# Patient Record
Sex: MALE | Race: WHITE | Employment: FULL TIME | URBAN - NONMETROPOLITAN AREA
[De-identification: names, ages, dates, MRNs, and addresses within clinical notes are randomized per-mention and may not be internally consistent; named-entity substitution may affect disease eponyms.]

---

## 2021-12-16 ENCOUNTER — TELEPHONE (OUTPATIENT)
Dept: PAIN MANAGEMENT | Age: 31
End: 2021-12-16

## 2021-12-16 NOTE — PROGRESS NOTES
OARRS checked:    11/12/21 Hydrocodone 5/325 mg #10 per prescriber Anaya Banegas with one valium    1/15/21 Flexeril 10 mg #30 per [facility].

## 2021-12-16 NOTE — TELEPHONE ENCOUNTER
LM for pt to call to schedule New visit with Pain Mgmt per referral from Imelda Ervin. See scan. Pt's address is [place].

## 2021-12-21 VITALS
SYSTOLIC BLOOD PRESSURE: 135 MMHG | DIASTOLIC BLOOD PRESSURE: 94 MMHG | HEIGHT: 71 IN | WEIGHT: 194 LBS | BODY MASS INDEX: 27.16 KG/M2

## 2021-12-21 RX ORDER — FAMOTIDINE 20 MG/1
20 TABLET, FILM COATED ORAL 2 TIMES DAILY
COMMUNITY

## 2021-12-21 RX ORDER — IBUPROFEN 200 MG
200 TABLET ORAL EVERY 6 HOURS PRN
COMMUNITY

## 2021-12-21 RX ORDER — DICLOFENAC SODIUM 75 MG/1
1 TABLET, DELAYED RELEASE ORAL 2 TIMES DAILY
COMMUNITY
Start: 2021-10-12

## 2021-12-21 RX ORDER — ACETAMINOPHEN, ASPIRIN AND CAFFEINE 250; 250; 65 MG/1; MG/1; MG/1
1 TABLET, FILM COATED ORAL EVERY 6 HOURS PRN
COMMUNITY

## 2021-12-21 NOTE — TELEPHONE ENCOUNTER
Pt returned call. I scheduled him with Tammie Medley on Tues 1/10 at 1:30 PM.    This is his first time to any pain mgmt. Oarrs checked again, Dr. Sophronia Epley and [facility], small refills.

## 2022-01-11 ENCOUNTER — OFFICE VISIT (OUTPATIENT)
Dept: PAIN MANAGEMENT | Age: 32
End: 2022-01-11
Payer: COMMERCIAL

## 2022-01-11 VITALS
WEIGHT: 195.4 LBS | HEIGHT: 71 IN | OXYGEN SATURATION: 97 % | DIASTOLIC BLOOD PRESSURE: 82 MMHG | BODY MASS INDEX: 27.35 KG/M2 | HEART RATE: 106 BPM | SYSTOLIC BLOOD PRESSURE: 122 MMHG

## 2022-01-11 DIAGNOSIS — G89.29 CHRONIC BILATERAL LOW BACK PAIN WITHOUT SCIATICA: ICD-10-CM

## 2022-01-11 DIAGNOSIS — M54.06 PANNICULITIS INVOLVING LUMBAR REGION: ICD-10-CM

## 2022-01-11 DIAGNOSIS — M54.50 CHRONIC BILATERAL LOW BACK PAIN WITHOUT SCIATICA: ICD-10-CM

## 2022-01-11 DIAGNOSIS — M47.816 LUMBAR FACET JOINT SYNDROME: Primary | ICD-10-CM

## 2022-01-11 PROCEDURE — 99204 OFFICE O/P NEW MOD 45 MIN: CPT | Performed by: NURSE PRACTITIONER

## 2022-01-11 ASSESSMENT — ENCOUNTER SYMPTOMS
RESPIRATORY NEGATIVE: 1
BACK PAIN: 1
EYES NEGATIVE: 1
ALLERGIC/IMMUNOLOGIC NEGATIVE: 1
BLOOD IN STOOL: 1

## 2022-01-11 NOTE — PATIENT INSTRUCTIONS
[facility]  [address]  Telephone [phone]  Fax [phone]      PROCEDURE INSTRUCTIONS FOR  PAIN MANAGEMENT PROCEDURES WITHOUT IV SEDATION    Isidra Tabor scheduled to see Dr. Mikael Cantu to undergo the following procedure:  Bilateral L5-S1 Diagnostic Medial Branch Block    Procedure Date: ____1/21/22____  You will receive a phone call the day prior to your procedure to confirm a time of arrival.    Report to the [place] [id number], Registration office on the 1st floor in the hospital, after check in and signing of paper work you will then go to the second floor to the surgery center. 1. Stop the following medications prior to the procedure:    NONE    2. Take all routine medications unless otherwise instructed. Ok to take vitamins and antiinflammatory medications    3. EATING & DRINKING:  Ok to eat or drink before the injection - no IV sedation will be used. 4. If you are allergic to contrast or iodine, you must take benadryl and prednisone prior to the injection to prevent an allergic reaction. Follow the directions on the prescription for the times to take the medication. 5. Oral valium can be prescribed if your are anxious about the injections or feel that you can not lay still during the injection. If you take valium, you must have a . Make arrangements for a family member or friend to drive you to the surgery center. Your ride must stay in the hospital while you are having the injection done. If they cannot stay, the injection will be rescheduled. The valium may affect your judgment following the procedure and driving a vehicle within 24 hours after the sedation could be dangerous. 6.  Wear simple loose clothing, which can be easily changed. 7.  Leave jewelry (including rings) and other valuables at home.      8.  You will be asked to sign several forms prior to surgery; patients under the age of 25 must have a parent or legal guardian sign the permit to be able to do the procedure. 9.  You must have finished any antibiotic prescribed for recent infections. If required, please take pre-procedure antibiotic or other pre-procedure medications as instructed. 10. Bring inhalers and pain medications with you to your procedure. 11. Bring your MRI/CT films if they were done outside of the [facility]. 12. If you should develop a cold, sore throat, cough, fever or other new indication of illness or infection, or are started on antibiotics within 2 weeks of the scheduled procedure, please notify the [facility] office as early as possible at [phone]. If calling after 4:30pm the day prior to your scheduled procedure please contact [phone] and Leave a Voice Message.

## 2022-01-21 ENCOUNTER — APPOINTMENT (OUTPATIENT)
Dept: GENERAL RADIOLOGY | Age: 32
End: 2022-01-21
Attending: PHYSICAL MEDICINE & REHABILITATION
Payer: COMMERCIAL

## 2022-01-21 ENCOUNTER — HOSPITAL ENCOUNTER (OUTPATIENT)
Age: 32
Setting detail: OUTPATIENT SURGERY
Discharge: HOME | End: 2022-01-21
Attending: PHYSICAL MEDICINE & REHABILITATION | Admitting: PHYSICAL MEDICINE & REHABILITATION
Payer: COMMERCIAL

## 2022-01-21 VITALS
RESPIRATION RATE: 16 BRPM | BODY MASS INDEX: 27.02 KG/M2 | SYSTOLIC BLOOD PRESSURE: 122 MMHG | OXYGEN SATURATION: 98 % | HEART RATE: 66 BPM | HEIGHT: 71 IN | WEIGHT: 193 LBS | DIASTOLIC BLOOD PRESSURE: 76 MMHG | TEMPERATURE: 97.3 F

## 2022-01-21 PROCEDURE — 6360000004 HC RX CONTRAST MEDICATION: Performed by: PHYSICAL MEDICINE & REHABILITATION

## 2022-01-21 PROCEDURE — 3600000056 HC PAIN LEVEL 4 BASE: Performed by: PHYSICAL MEDICINE & REHABILITATION

## 2022-01-21 PROCEDURE — 2709999900 HC NON-CHARGEABLE SUPPLY: Performed by: PHYSICAL MEDICINE & REHABILITATION

## 2022-01-21 PROCEDURE — 2500000003 HC RX 250 WO HCPCS: Performed by: PHYSICAL MEDICINE & REHABILITATION

## 2022-01-21 PROCEDURE — 3209999900 FLUORO FOR SURGICAL PROCEDURES

## 2022-01-21 PROCEDURE — 7100000010 HC PHASE II RECOVERY - FIRST 15 MIN: Performed by: PHYSICAL MEDICINE & REHABILITATION

## 2022-01-21 PROCEDURE — 64493 INJ PARAVERT F JNT L/S 1 LEV: CPT | Performed by: PHYSICAL MEDICINE & REHABILITATION

## 2022-01-21 RX ORDER — LIDOCAINE HYDROCHLORIDE 20 MG/ML
INJECTION, SOLUTION EPIDURAL; INFILTRATION; INTRACAUDAL; PERINEURAL PRN
Status: DISCONTINUED | OUTPATIENT
Start: 2022-01-21 | End: 2022-01-21 | Stop reason: ALTCHOICE

## 2022-01-21 ASSESSMENT — PAIN - FUNCTIONAL ASSESSMENT: PAIN_FUNCTIONAL_ASSESSMENT: 0-10

## 2022-01-21 ASSESSMENT — PAIN DESCRIPTION - DESCRIPTORS: DESCRIPTORS: SHARP;STABBING

## 2022-01-21 ASSESSMENT — ENCOUNTER SYMPTOMS
EYES NEGATIVE: 1
BACK PAIN: 1
BLOOD IN STOOL: 1
RESPIRATORY NEGATIVE: 1
ALLERGIC/IMMUNOLOGIC NEGATIVE: 1

## 2022-01-21 ASSESSMENT — PAIN SCALES - GENERAL: PAINLEVEL_OUTOF10: 1

## 2022-01-21 ASSESSMENT — PAIN DESCRIPTION - PAIN TYPE: TYPE: CHRONIC PAIN

## 2022-01-21 NOTE — INTERVAL H&P NOTE
I have interviewed and examined the patient and reviewed the recent History and Physical.  There have been no changes to the recent H&P documentation. The surgical consent form has been signed. Last anticoagulant medication use was:na    Premedication taken for contrast allergy? No    Valium taken for oral sedation? No    Outpatient Medications Marked as Taking for the 1/21/22 encounter [facility] Encounter)   Medication Sig Dispense Refill    ibuprofen (ADVIL;MOTRIN) 200 MG tablet Take 200 mg by mouth every 6 hours as needed for Pain       famotidine (PEPCID) 20 MG tablet Take 20 mg by mouth 2 times daily      aspirin-acetaminophen-caffeine (EXCEDRIN EXTRA STRENGTH) 250-250-65 MG per tablet Take 1 tablet by mouth every 6 hours as needed for Headaches       raNITIdine HCl (RANITIDINE 75 PO) Take by mouth       diclofenac (VOLTAREN) 75 MG EC tablet Take 1 tablet by mouth 2 times daily          The patient understands the planned operation and its associated risks and benefits and agrees to proceed.         Electronically signed by Alireza Nath MD on 1/21/2022 at 8:12 AM

## 2022-01-21 NOTE — OP NOTE
MEDIAL BRANCH BLOCK   Diagnostic  1/21/22    Surgeon: Magalys Reed MD    Pre-operative Diagnosis:   Hospital Problems           Last Modified POA    * (Principal) Panniculitis involving lumbar region 1/21/2022 Yes    Lumbar facet joint syndrome 1/21/2022 Yes          Post-operative Diagnosis: Same    INDICATION:Please see H&P for details on previous treatments, examination findings, and work up. bilateral L5, S1 medial branch blocks are requested for diagnostic reasons. Conservative treatment was ineffective i.e.: ice, NSAIDS, rest, narcotic medication, chiropractic care, physical therapy and message therapy. Patient is unable to perform the following ADL's: ambulating and grooming     Pain Assessment: 0-10  Pain Level: 8     Pain Orientation: Left,Lower  Pain Location: Back  Pain Descriptors: Sharp,Stabbing    Last Plain films: 2020    EXAMINATION:  bilateral L5, S1 medial branch blocks. CONSENT:  Written consent was obtained from the patient on preprinted consent form after explaining the procedure, indications, potential complications and outcomes. Alternative treatments were also discussed. DISCUSSION:  The patient was sterilely prepped and draped in the usual fashion in the prone position. Time out was verified for correct patient, side, level and procedure. SEDATION:   No conscious sedation was performed during the procedure. The patient remained awake and conversed throughout the procedure. The patient underwent pulse oximetry and blood pressure monitoring independently by a trained observer, as well as by a physician. PROCEDURE:   Under image-intensifier control, 22 gauge needle x 5 inch spinal needles were directed into the bilateral L5, S1 medial branches of the dorsal rami at the target points, according to REESE guidelines. Needle tip positions were confirmed with 0.2 mL of Omnipaque 240 contrast medium.  Then, 1mL of equal volumes of 0.75% bupivacaine // 2% lidocaine / and Celestone> were instilled at each site. EBL: no blood loss    SPECIMEN: none    The patient tolerated the procedure well and without complications and was noted to be in stable condition prior to discharge from the procedure center with discharge instructions. IMPRESSIONS:  1.     bilateral L5, S1 medial branch blocks performed uneventfully. 2.      bilateralL5, S1 medial branch blocks decreased pain to a 2 on 0 to 10 numeric pain scale at 15 and 30 minutes after the procedure. RECOMMENDATIONS:  1. Complete and return the \"Post-Procedure Pain and Activity Diary.   2. Contact me for symptom exacerbation, fever or unusual symptoms. 4.      Post-procedure care according to verbal and written instructions at discharge. 3. Follow this block with physical therapy, as per MD directions. 4. Consider confirmatory MBB if there is > 80% pain relief and improved activity scores. POST-PROCEDURE LUMBAR/CERVICAL SPINE FLUOROSCOPIC IMAGE INTERPRETATION:    EXAMINATION: AP, lateral, and oblique views. FLUORO TIME: 12 seconds    DISCUSSION:  Spot views of the spine reveal normal alignment and segmentation. Spinal needles are positioned at the base of the SAP at the junction with the transverse process/sacral ala  OR center of  the articular pillars on PA and lateral views. Contrast at needle tip confirms satisfactory placement. Visualized spine reveals bilateral See radiology report. Soft tissues reveal no abnormalities. IMPRESSION:  Satisfactory needle placement and contrast dispersal for bilateral L5, S1 medial branch block.      Electronically signed by Derian Cee MD on 1/21/2022 at 8:13 AM

## 2022-01-21 NOTE — H&P
Subjective:      Patient ID: Lilly Selby is a 33 y.o. male. No chief complaint on file. HPI Initial new patient consult, referred via Dr. Clem Amaya for lumbar spinal procedures         Allergies   Allergen Reactions    Penicillin V        Outpatient Medications Marked as Taking for the 1/21/22 encounter [facility] Encounter)   Medication Sig Dispense Refill    ibuprofen (ADVIL;MOTRIN) 200 MG tablet Take 200 mg by mouth every 6 hours as needed for Pain       famotidine (PEPCID) 20 MG tablet Take 20 mg by mouth 2 times daily      aspirin-acetaminophen-caffeine (EXCEDRIN EXTRA STRENGTH) 250-250-65 MG per tablet Take 1 tablet by mouth every 6 hours as needed for Headaches       raNITIdine HCl (RANITIDINE 75 PO) Take by mouth       diclofenac (VOLTAREN) 75 MG EC tablet Take 1 tablet by mouth 2 times daily          Past Medical History:   Diagnosis Date    Foraminal stenosis of lumbar region     L5-S1    GERD (gastroesophageal reflux disease)     Headache     migraines    History of herniorrhaphy     HTN (hypertension)     Spinal stenosis     Spondylosis of lumbosacral region     L5-S1       Past Surgical History:   Procedure Laterality Date    COLONOSCOPY  2019    HERNIA REPAIR      VASECTOMY  11/12/2021       History reviewed. No pertinent family history.     Social History     Socioeconomic History    Marital status:      Spouse name: None    Number of children: None    Years of education: None    Highest education level: None   Occupational History    None   Tobacco Use    Smoking status: Never Smoker    Smokeless tobacco: Never Used   Substance and Sexual Activity    Alcohol use: Yes     Comment: socially    Drug use: None    Sexual activity: None   Other Topics Concern    None   Social History Narrative    None     Social Determinants of Health     Financial Resource Strain:     Difficulty of Paying Living Expenses: Not on file   Food Insecurity:     Worried About Running Out of Food in the Last Year: Not on file    Ran Out of Food in the Last Year: Not on file   Transportation Needs:     Lack of Transportation (Medical): Not on file    Lack of Transportation (Non-Medical): Not on file   Physical Activity:     Days of Exercise per Week: Not on file    Minutes of Exercise per Session: Not on file   Stress:     Feeling of Stress : Not on file   Social Connections:     Frequency of Communication with Friends and Family: Not on file    Frequency of Social Gatherings with Friends and Family: Not on file    Attends Faith Services: Not on file    Active Member of [address] [place] [address] [employer] or Organizations: Not on file    Attends Club or Organization Meetings: Not on file    Marital Status: Not on file   Intimate Partner Violence:     Fear of Current or Ex-Partner: Not on file    Emotionally Abused: Not on file    Physically Abused: Not on file    Sexually Abused: Not on file   Housing Stability:     Unable to Pay for Housing in the Last Year: Not on file    Number of [place] in the Last Year: Not on file    Unstable Housing in the Last Year: Not on file     Review of Systems   Constitutional: Negative for activity change. HENT: Negative. Eyes: Negative. Respiratory: Negative. Gastrointestinal: Positive for blood in stool (from NSAIDs). Endocrine: Negative. Genitourinary: Negative. Musculoskeletal: Positive for arthralgias, back pain and myalgias. Skin: Negative. Allergic/Immunologic: Negative. Neurological: Negative for weakness and numbness. Hematological: Does not bruise/bleed easily. Psychiatric/Behavioral: Positive for sleep disturbance. Objective:   Physical Exam  Vitals reviewed. Constitutional:       General: He is awake. He is not in acute distress. Appearance: Normal appearance. He is well-developed, well-groomed and normal weight. He is not ill-appearing, toxic-appearing or diaphoretic. Interventions: He is not intubated.   HENT: Head: Normocephalic and atraumatic. Right Ear: Hearing and external ear normal.      Left Ear: Hearing and external ear normal.   Eyes:      General: Lids are normal.   Cardiovascular:      Rate and Rhythm: Normal rate. Pulmonary:      Effort: Pulmonary effort is normal. No tachypnea, bradypnea, accessory muscle usage, prolonged expiration, respiratory distress or retractions. He is not intubated. Abdominal:      General: Abdomen is flat. Palpations: Abdomen is soft. Musculoskeletal:      Right lower leg: No edema. Left lower leg: No edema. Right foot: No Charcot foot or foot drop. Left foot: No Charcot foot or foot drop. Skin:     General: Skin is warm and dry. Capillary Refill: Capillary refill takes less than 2 seconds. Coloration: Skin is not ashen, jaundiced or pale. Findings: No rash. Nails: There is no clubbing. Neurological:      Mental Status: He is alert and oriented to person, place, and time. Cranial Nerves: No cranial nerve deficit. Psychiatric:         Attention and Perception: Attention normal.         Mood and Affect: Mood and affect normal.         Speech: Speech normal.         Behavior: Behavior is cooperative. Cognition and Memory: Cognition normal.         Judgment: Judgment normal.       Assessment / Plan:       Diagnosis Orders   1. Lumbar facet joint syndrome     2. Panniculitis involving lumbar region     3. Chronic bilateral low back pain without sciatica          Lumbar spine xray reviewed, Dr. Haven Hogan note reviewed. Will schedule bilateral L5-S1 diagnostic MBB. If no relief, zia L5 TFE    Informed consent has not been obtained for procedure. Cony Drape Domoeis not on blood thinners. Medications to hold have been reviewed with patient. Blood thinners must be held with permission from your cardiologist or primary care physician.  A letter is not required to besent to PCP/Cardiologist regarding holding medications for procedure to decrease bleeding risk.

## 2022-02-08 ENCOUNTER — TELEPHONE (OUTPATIENT)
Dept: PAIN MANAGEMENT | Age: 32
End: 2022-02-08

## 2022-02-08 NOTE — TELEPHONE ENCOUNTER
The patient called and stated that he had a VV on 1/25/22 with Fer Donovan. One stated cancelled and the other NO Show. Fer Donovan, he said you called him, is the documentation done.      Writer will schedule him for his CMBB

## 2022-02-10 NOTE — TELEPHONE ENCOUNTER
Ac Bradford stated that the patient is okay to schedule the CMBB, however, he needs to call the office and state % relief and also the duration of that relief from his  Diagnostic. A message was left for the patient.      He is currently scheduled for 2/24/22

## 2022-02-17 NOTE — TELEPHONE ENCOUNTER
Patient stated that he had 100% relief from Snata MBB and that relief last between 4 to 5 hours. It appears the no show letter has been voided.

## 2022-02-24 ENCOUNTER — HOSPITAL ENCOUNTER (OUTPATIENT)
Age: 32
Setting detail: OUTPATIENT SURGERY
Discharge: HOME | End: 2022-02-24
Attending: PHYSICAL MEDICINE & REHABILITATION | Admitting: PHYSICAL MEDICINE & REHABILITATION
Payer: COMMERCIAL

## 2022-02-24 ENCOUNTER — APPOINTMENT (OUTPATIENT)
Dept: GENERAL RADIOLOGY | Age: 32
End: 2022-02-24
Attending: PHYSICAL MEDICINE & REHABILITATION
Payer: COMMERCIAL

## 2022-02-24 VITALS
SYSTOLIC BLOOD PRESSURE: 122 MMHG | RESPIRATION RATE: 16 BRPM | DIASTOLIC BLOOD PRESSURE: 76 MMHG | HEART RATE: 74 BPM | WEIGHT: 193 LBS | OXYGEN SATURATION: 99 % | BODY MASS INDEX: 27.02 KG/M2 | HEIGHT: 71 IN | TEMPERATURE: 98 F

## 2022-02-24 PROCEDURE — 2500000003 HC RX 250 WO HCPCS: Performed by: PHYSICAL MEDICINE & REHABILITATION

## 2022-02-24 PROCEDURE — 64493 INJ PARAVERT F JNT L/S 1 LEV: CPT | Performed by: PHYSICAL MEDICINE & REHABILITATION

## 2022-02-24 PROCEDURE — 7100000011 HC PHASE II RECOVERY - ADDTL 15 MIN: Performed by: PHYSICAL MEDICINE & REHABILITATION

## 2022-02-24 PROCEDURE — 2709999900 HC NON-CHARGEABLE SUPPLY: Performed by: PHYSICAL MEDICINE & REHABILITATION

## 2022-02-24 PROCEDURE — 7100000010 HC PHASE II RECOVERY - FIRST 15 MIN: Performed by: PHYSICAL MEDICINE & REHABILITATION

## 2022-02-24 PROCEDURE — 6360000004 HC RX CONTRAST MEDICATION: Performed by: PHYSICAL MEDICINE & REHABILITATION

## 2022-02-24 PROCEDURE — 3600000056 HC PAIN LEVEL 4 BASE: Performed by: PHYSICAL MEDICINE & REHABILITATION

## 2022-02-24 PROCEDURE — 3209999900 FLUORO FOR SURGICAL PROCEDURES

## 2022-02-24 RX ORDER — BUPIVACAINE HYDROCHLORIDE 7.5 MG/ML
INJECTION, SOLUTION EPIDURAL; RETROBULBAR PRN
Status: DISCONTINUED | OUTPATIENT
Start: 2022-02-24 | End: 2022-02-24 | Stop reason: ALTCHOICE

## 2022-02-24 ASSESSMENT — ENCOUNTER SYMPTOMS
RESPIRATORY NEGATIVE: 1
EYES NEGATIVE: 1
NAUSEA: 0
CONSTIPATION: 0
BACK PAIN: 1
ALLERGIC/IMMUNOLOGIC NEGATIVE: 1

## 2022-02-24 ASSESSMENT — PAIN SCALES - GENERAL: PAINLEVEL_OUTOF10: 0

## 2022-02-24 ASSESSMENT — PAIN - FUNCTIONAL ASSESSMENT: PAIN_FUNCTIONAL_ASSESSMENT: 0-10

## 2022-02-24 NOTE — H&P
Subjective:       Patient is here today for a diagnostic/therapeutic injection. 2/24/22    Active Hospital Problems    Diagnosis Date Noted    Panniculitis involving lumbar region [M54.06] 01/11/2022    Lumbar facet joint syndrome [M47.816] 01/11/2022       HPI: Patient is here today for adiagnostic/therapeutic injection. The most recent [facility] Pain Management office visit notes have been reviewed and are unchanged. Review of Systems   Constitutional: Positive for fatigue. HENT: Negative. Eyes: Negative. Respiratory: Negative. Cardiovascular: Negative. Gastrointestinal: Negative for constipation and nausea. Endocrine: Negative. Genitourinary: Negative for difficulty urinating. Musculoskeletal: Positive for arthralgias, back pain and myalgias. Skin: Negative. Allergic/Immunologic: Negative. Neurological: Positive for weakness and numbness. Hematological: Negative. Psychiatric/Behavioral: Positive for sleep disturbance. All other systems reviewed and are negative. Allergies   Allergen Reactions    Penicillin V           Prior to Admission medications    Medication Sig Start Date End Date Taking?  Authorizing Provider   ibuprofen (ADVIL;MOTRIN) 200 MG tablet Take 200 mg by mouth every 6 hours as needed for Pain    Yes Historical Provider, MD   famotidine (PEPCID) 20 MG tablet Take 20 mg by mouth 2 times daily    Historical Provider, MD   aspirin-acetaminophen-caffeine ([place] 8057) [phone] MG per tablet Take 1 tablet by mouth every 6 hours as needed for Headaches     Historical Provider, MD   raNITIdine HCl (RANITIDINE 75 PO) Take by mouth     Historical Provider, MD   diclofenac (VOLTAREN) 75 MG EC tablet Take 1 tablet by mouth 2 times daily  10/12/21   Historical Provider, MD       Past Medical History:   Diagnosis Date    Foraminal stenosis of lumbar region     L5-S1    GERD (gastroesophageal reflux disease)     Headache migraines    History of herniorrhaphy     HTN (hypertension)     Spinal stenosis     Spondylosis of lumbosacral region     L5-S1       Past Surgical History:   Procedure Laterality Date    COLONOSCOPY  2019    HERNIA REPAIR      NERVE BLOCK Bilateral 2022    Bilateral L5-S1 Diagnostic Medial Branch Block performed by Mildred Manzanares MD at [address] [place] [address] Road  2021       Family andSocial History reviewed in the electronic medical record. Imaging: Reviewed available imaging in oursystem with the patient. No results found. Objective:     Vitals:    22 0725   BP: 121/78   Pulse: 97   Resp: 16   Temp: 98.6 °F (37 °C)   SpO2: 98%          Physical Exam  Constitutional:       Appearance: He is well-developed. HENT:      Head: Normocephalic and atraumatic. Cardiovascular:      Pulses: Normal pulses. Comments: Warm extremities. Normal capillary refill. Pulmonary:      Effort: Pulmonary effort is normal.   Abdominal:      General: Abdomen is flat. Palpations: Abdomen is soft. Musculoskeletal:      Lumbar back: Negative right straight leg raise test and negative left straight leg raise test.   Skin:     General: Skin is warm and dry. Neurological:      General: No focal deficit present. Mental Status: He is alert and oriented to person, place, and time. Cranial Nerves: No cranial nerve deficit. Sensory: No sensory deficit. Motor: No atrophy or abnormal muscle tone. Deep Tendon Reflexes: Reflexes are normal and symmetric. Psychiatric:         Mood and Affect: Mood normal.         Speech: Speech normal.         Behavior: Behavior normal.       Neurologic Exam     Mental Status   Oriented to person, place, and time.    Speech: speech is normal     Motor Exam     Strength   Right quadriceps: 5/5  Left quadriceps: 5/5  Right hamstrin/5  Left hamstrin/5    Back Exam     Tenderness   The patient is experiencing tenderness in the lumbar (+kemps Anthony).    Range of Motion   Extension: normal   Flexion: normal   Lateral bend right: normal   Lateral bend left: normal   Rotation right: normal   Rotation left: normal     Muscle Strength   Right Quadriceps:  5/5   Left Quadriceps:  5/5   Right Hamstrings:  5/5   Left Hamstrings:  5/5     Tests   Straight leg raise right: negative  Straight leg raise left: negative    Other   Toe walk: normal  Heel walk: normal  Sensation: normal  Gait: normal             No results found for: WBC, HGB, HCT, PLT, CHOL, TRIG, HDL, LDLDIRECT, ALT, AST, NA, K, CL, CREATININE, BUN, CO2, TSH, PSA, INR, GLUF, LABA1C, LABMICR    Assessment:                            Active Hospital Problems    Diagnosis Date Noted    Panniculitis involving lumbar region [M54.06] 01/11/2022    Lumbar facet joint syndrome [M47.816] 01/11/2022                  Plan:   Proceed with planned procedure  - Anthony L5-S1 Confirm MBB    The patientunderstands the planned operation and its associated risks and benefits and agrees to proceed. The surgical consent form has been signed. Last NSAID/anticoagulant medication use was:-    Premedication taken for contrast allergy? No    Valium taken for oral sedation?  na

## 2022-02-24 NOTE — OP NOTE
MEDIAL BRANCH BLOCK   Confirmatory  2/24/22  The patient was counseled at length about the risks of ana maria Covid-19 during their perioperative period and any recovery window from their procedure. The patient was made aware that ana maria Covid-19  may worsen their prognosis for recovering from their procedure  and lend to a higher morbidity and/or mortality risk. All material risks, benefits, and reasonable alternatives including postponing the procedure were discussed. The patient does wish to proceed with the procedure at this time. Surgeon: Eva Jalloh MD    Pre-operative Diagnosis:   Hospital Problems           Last Modified POA    * (Principal) Panniculitis involving lumbar region 2/24/2022 Yes    Lumbar facet joint syndrome 2/24/2022 Yes          Post-operative Diagnosis: Same    INDICATION:Please see H&P for details on previous treatments, examination findings, and work up. bilateral L5,-S1 medial branch blocks are requested for diagnostic reasons. Conservative treatment was ineffective i.e.: ice, NSAIDS, rest, narcotic medication, chiropractic care, physical therapy and message therapy. Patient is unable to perform the following ADL's: ambulating and grooming     Pain Assessment: 0-10  Pain Level: 4     Pain Orientation: Lower  Pain Location: Back       Last Plain films: 2021    EXAMINATION:  bilateral L5-S1 medial branch blocks. CONSENT:  Written consent was obtained from the patient on preprinted consent form after explaining the procedure, indications, potential complications and outcomes. Alternative treatments were also discussed. DISCUSSION:  The patient was sterilely prepped and draped in the usual fashion in the prone position. Time out was verified for correct patient, side, level and procedure. SEDATION:   No conscious sedation was performed during the procedure. The patient remained awake and conversed throughout the procedure.   The patient underwent pulse oximetry and blood pressure monitoring independently by a trained observer, as well as by a physician. PROCEDURE:   Under image-intensifier control, 22 gauge needle x 5 inch spinal needles were directed into the bilateral L5-S1 medial branches of the dorsal rami at the target points, according to REESE guidelines. Needle tip positions were confirmed with 0.2 mL of Omnipaque 240 contrast medium. Then, 1mL of equal volumes of 0.75% bupivacaine // 2% lidocaine / and Celestone> were instilled at each site. EBL: no blood loss    SPECIMEN: none    The patient tolerated the procedure well and without complications and was noted to be in stable condition prior to discharge from the procedure center with discharge instructions. IMPRESSIONS:  1.     bilateral L5-S1 medial branch blocks performed uneventfully. 2.      bilateralL5-S1 medial branch blocks decreased pain to a 2 on 0 to 10 numeric pain scale at 15 and 30 minutes after the procedure. RECOMMENDATIONS:  1. Complete and return the \"Post-Procedure Pain and Activity Diary.   2. Contact me for symptom exacerbation, fever or unusual symptoms. 4.      Post-procedure care according to verbal and written instructions at discharge. 3. Follow this block with physical therapy, as per MD directions. 4. Consider confirmatory MBB if there is > 80% pain relief and improved activity scores. POST-PROCEDURE LUMBAR/CERVICAL SPINE FLUOROSCOPIC IMAGE INTERPRETATION:    EXAMINATION: AP, lateral, and oblique views. FLUORO TIME: 21 seconds    DISCUSSION:  Spot views of the spine reveal normal alignment and segmentation. Spinal needles are positioned at the base of the SAP at the junction with the transverse process/sacral ala  OR center of  the articular pillars on PA and lateral views. Contrast at needle tip confirms satisfactory placement. Visualized spine reveals bilateral See radiology report. Soft tissues reveal no abnormalities.     IMPRESSION:  Satisfactory needle placement and contrast dispersal for bilateral L5-S1 medial branch block.      Electronically signed by Lamont Ross MD on 2/24/2022 at 8:35 AM

## 2022-03-17 ENCOUNTER — PRE-PROCEDURE TELEPHONE (OUTPATIENT)
Dept: PREADMISSION TESTING | Age: 32
End: 2022-03-17

## 2022-03-17 NOTE — TELEPHONE ENCOUNTER
Spoke with patient and confirmed a covid swab appt for March 23rd at 0730. Instructions provided, verbalizes understanding.

## 2022-03-23 ENCOUNTER — HOSPITAL ENCOUNTER (OUTPATIENT)
Dept: PREADMISSION TESTING | Age: 32
Setting detail: SPECIMEN
Discharge: HOME | End: 2022-03-27
Payer: COMMERCIAL

## 2022-03-23 DIAGNOSIS — Z11.59 ENCOUNTER FOR SCREENING FOR OTHER VIRAL DISEASES: Primary | ICD-10-CM

## 2022-03-23 PROCEDURE — U0005 INFEC AGEN DETEC AMPLI PROBE: HCPCS

## 2022-03-23 PROCEDURE — U0003 INFECTIOUS AGENT DETECTION BY NUCLEIC ACID (DNA OR RNA); SEVERE ACUTE RESPIRATORY SYNDROME CORONAVIRUS 2 (SARS-COV-2) (CORONAVIRUS DISEASE [COVID-19]), AMPLIFIED PROBE TECHNIQUE, MAKING USE OF HIGH THROUGHPUT TECHNOLOGIES AS DESCRIBED BY CMS-2020-01-R: HCPCS

## 2022-03-24 LAB
SARS-COV-2: NORMAL
SARS-COV-2: NOT DETECTED
SOURCE: NORMAL

## 2022-03-28 ENCOUNTER — APPOINTMENT (OUTPATIENT)
Dept: GENERAL RADIOLOGY | Age: 32
End: 2022-03-28
Attending: PHYSICAL MEDICINE & REHABILITATION
Payer: COMMERCIAL

## 2022-03-28 ENCOUNTER — HOSPITAL ENCOUNTER (OUTPATIENT)
Age: 32
Setting detail: OUTPATIENT SURGERY
Discharge: HOME | End: 2022-03-28
Attending: PHYSICAL MEDICINE & REHABILITATION | Admitting: PHYSICAL MEDICINE & REHABILITATION
Payer: COMMERCIAL

## 2022-03-28 VITALS
OXYGEN SATURATION: 98 % | RESPIRATION RATE: 16 BRPM | WEIGHT: 198.38 LBS | HEART RATE: 80 BPM | SYSTOLIC BLOOD PRESSURE: 129 MMHG | HEIGHT: 71 IN | DIASTOLIC BLOOD PRESSURE: 73 MMHG | BODY MASS INDEX: 27.77 KG/M2 | TEMPERATURE: 98 F

## 2022-03-28 PROCEDURE — 64635 DESTROY LUMB/SAC FACET JNT: CPT | Performed by: PHYSICAL MEDICINE & REHABILITATION

## 2022-03-28 PROCEDURE — 6360000002 HC RX W HCPCS: Performed by: PHYSICAL MEDICINE & REHABILITATION

## 2022-03-28 PROCEDURE — 3209999900 FLUORO FOR SURGICAL PROCEDURES

## 2022-03-28 PROCEDURE — 2709999900 HC NON-CHARGEABLE SUPPLY: Performed by: PHYSICAL MEDICINE & REHABILITATION

## 2022-03-28 PROCEDURE — 2500000003 HC RX 250 WO HCPCS: Performed by: PHYSICAL MEDICINE & REHABILITATION

## 2022-03-28 PROCEDURE — 7100000010 HC PHASE II RECOVERY - FIRST 15 MIN: Performed by: PHYSICAL MEDICINE & REHABILITATION

## 2022-03-28 PROCEDURE — 3600000056 HC PAIN LEVEL 4 BASE: Performed by: PHYSICAL MEDICINE & REHABILITATION

## 2022-03-28 RX ORDER — DEXAMETHASONE SODIUM PHOSPHATE 10 MG/ML
INJECTION INTRAMUSCULAR; INTRAVENOUS PRN
Status: DISCONTINUED | OUTPATIENT
Start: 2022-03-28 | End: 2022-03-28 | Stop reason: ALTCHOICE

## 2022-03-28 RX ORDER — BUPIVACAINE HYDROCHLORIDE 5 MG/ML
INJECTION, SOLUTION EPIDURAL; INTRACAUDAL PRN
Status: DISCONTINUED | OUTPATIENT
Start: 2022-03-28 | End: 2022-03-28 | Stop reason: ALTCHOICE

## 2022-03-28 RX ORDER — LIDOCAINE HYDROCHLORIDE 40 MG/ML
INJECTION, SOLUTION RETROBULBAR; TOPICAL PRN
Status: DISCONTINUED | OUTPATIENT
Start: 2022-03-28 | End: 2022-03-28 | Stop reason: ALTCHOICE

## 2022-03-28 RX ORDER — OMEPRAZOLE 20 MG/1
20 CAPSULE, DELAYED RELEASE ORAL DAILY
COMMUNITY

## 2022-03-28 ASSESSMENT — PAIN - FUNCTIONAL ASSESSMENT: PAIN_FUNCTIONAL_ASSESSMENT: 0-10

## 2022-03-28 ASSESSMENT — ENCOUNTER SYMPTOMS
NAUSEA: 0
BACK PAIN: 1
EYES NEGATIVE: 1
ALLERGIC/IMMUNOLOGIC NEGATIVE: 1
RESPIRATORY NEGATIVE: 1
CONSTIPATION: 0

## 2022-03-28 ASSESSMENT — PAIN DESCRIPTION - PAIN TYPE: TYPE: SURGICAL PAIN

## 2022-03-28 ASSESSMENT — PAIN SCALES - GENERAL: PAINLEVEL_OUTOF10: 1

## 2022-03-28 ASSESSMENT — PAIN DESCRIPTION - ONSET: ONSET: GRADUAL

## 2022-03-28 ASSESSMENT — PAIN DESCRIPTION - LOCATION: LOCATION: BACK

## 2022-03-28 ASSESSMENT — PAIN DESCRIPTION - ORIENTATION: ORIENTATION: RIGHT;LOWER

## 2022-03-28 NOTE — OP NOTE
RADIOFREQUENCY ABLATION OPERATIVE REPORT    3/28/22  The patient was counseled at length about the risks of ana maria Covid-19 during their perioperative period and any recovery window from their procedure. The patient was made aware that ana maria Covid-19  may worsen their prognosis for recovering from their procedure  and lend to a higher morbidity and/or mortality risk. All material risks, benefits, and reasonable alternatives including postponing the procedure were discussed. The patient does wish to proceed with the procedure at this time. Surgeon: Luther Farooq MD    Pre-operative Diagnosis:   Hospital Problems           Last Modified POA    * (Principal) Lumbar facet joint syndrome 3/28/2022 Yes    Panniculitis involving lumbar region 3/28/2022 Yes          Post-operative Diagnosis: Same    INDICATION:  Right L5, S1 radiofrequency neurotomy procedure is requested for therapeutic reasons. Please see H&P for details on previous treatments, examination findings, and work up. right R L5-S1 medial branch blocks are requested for diagnostic reasons. Conservative treatment was ineffective i.e.: ice, NSAIDS, rest,     Patient is unable to perform the following ADL's: ambulating, grooming, sitting and standing                         Last Plain films: 2020    EXAMINATION:  Right L5-S1 radiofrequency neurotomies. CONSENT:  Written consent was obtained from the patient on the preprinted consent form after explaining the procedure, indications, potential complications and outcomes. Alternative treatments were also discussed. DISCUSSION:  The patient was sterilely prepped and draped in the usual fashion in the prone position. Time out was verified for the correct patient, side, level and procedure. SEDATION:   No conscious sedation was performed during the procedure. The patient remained awake and conversed throughout the procedure.   The patient underwent pulse oximetry and blood pressure monitoring independently by a trained observer, as well as by a physician. FACET RF    Under image-intensifier control, a [id number]  mm RobotsAlive RFK insulated radiofrequency probe with 5 mm active tips were successfully directed at the Right R L5-S1 medial branches of the dorsal rami at the target points described by REESE. Needle tip positions were confirmed in 3 views and sensory and motor test stimulation was performed. The patient reported sensory stimulation at 0.0 to 0.0 volts at 50 Hz and motor stimulation at 0.0 to 0.9 volts at 2 Hz, which confirmed satisfactory sensory motor dissociation. 1 ml of 2% lidocaine was instilled  at each site prior to lesioning. 1 lesions were performed at each level  BY SAGITTAL APPROACH at a temperature of 80 degrees Celsius for a duration of 90 seconds, as described by REESE. Impedance was measured and ranged from 202-249 ohms. Then, 0.5mL of  0.5% bupivacaine was instilled at each site after lesioning. The patient tolerated the procedure(s) well and without complications and was noted to be in stable condition prior to discharge from procedure center with discharge instructions. EBL: no blood loss    SPECIMEN: none    IMPRESSION:    1. Right RL5-S1 radiofrequency neurotomies performed uneventfully. RECOMMENDATIONS:  1. Follow up in the [address] in 2 to 4 weeks  2. Continue Neurontin and opioid analgesia, as per protocol. 3.    Complete and return the \"Post-Procedure Pain and Activity Diary. 4.    Contact the [address] for symptom exacerbation, fever or unusual symptoms. 5.    Follow post-procedure care according to verbal and written instructions. POST-PROCEDURE LUMBAR SPINE FLUOROSCOPIC IMAGE INTERPRETATION:    EXAMINATION:  AP, lateral and oblique views of the lumbar spine    FLUORO TIME: 21 seconds    DISCUSSION:  Spot views of the spine reveal normal alignment and segmentation.  Spinal needles are positioned at the base of the SAP and across the pedicle on AP and lateral views. #3:  across the ventral and middle third of the articular pillar. Visualized spine reveals See radiology report. Soft tissues reveal no abnormalities. IMPRESSION: Satisfactory probe placement for RF (radiofrequency) lesioning of the Right L5-S1 medial branches.     Electronically signed by Mikael Emery MD on 3/28/2022 at 10:08 AM

## 2022-03-28 NOTE — H&P
Subjective:       Patient is here today for a diagnostic/therapeutic injection. 3/28/22    Active Hospital Problems    Diagnosis Date Noted    Lumbar facet joint syndrome [M47.816] 01/11/2022    Panniculitis involving lumbar region [M54.06] 01/11/2022       HPI: Patient is here today for adiagnostic/therapeutic injection. The most recent [facility] Management office visit notes have been reviewed and are unchanged. Review of Systems   Constitutional: Positive for fatigue. HENT: Negative. Eyes: Negative. Respiratory: Negative. Cardiovascular: Negative. Gastrointestinal: Negative for constipation and nausea. Endocrine: Negative. Genitourinary: Negative for difficulty urinating. Musculoskeletal: Positive for arthralgias, back pain and myalgias. Skin: Negative. Allergic/Immunologic: Negative. Neurological: Positive for weakness and numbness. Hematological: Negative. Psychiatric/Behavioral: Positive for sleep disturbance. All other systems reviewed and are negative. Allergies   Allergen Reactions    Penicillin V Hives          Prior to Admission medications    Medication Sig Start Date End Date Taking?  Authorizing Provider   omeprazole (PRILOSEC) 20 MG delayed release capsule Take 20 mg by mouth daily Indications: Heartburn, patient buys OTC   Yes Historical Provider, MD   ibuprofen (ADVIL;MOTRIN) 200 MG tablet Take 200 mg by mouth every 6 hours as needed for Pain     Historical Provider, MD   famotidine (PEPCID) 20 MG tablet Take 20 mg by mouth 2 times daily    Historical Provider, MD   aspirin-acetaminophen-caffeine (Ibirapita 8057) [phone] MG per tablet Take 1 tablet by mouth every 6 hours as needed for Headaches     Historical Provider, MD   raNITIdine HCl (RANITIDINE 75 PO) Take by mouth     Historical Provider, MD   diclofenac (VOLTAREN) 75 MG EC tablet Take 1 tablet by mouth 2 times daily  10/12/21   Historical Provider, MD Ortiz speech is normal     Motor Exam     Strength   Right quadriceps: 5/5  Left quadriceps: 5/5  Right hamstrin/5  Left hamstrin/5    Back Exam     Range of Motion   Extension: normal   Flexion: normal   Lateral bend right: normal   Lateral bend left: normal   Rotation right: normal   Rotation left: normal     Muscle Strength   Right Quadriceps:  5/5   Left Quadriceps:  5/5   Right Hamstrings:  5/5   Left Hamstrings:  5/5     Tests   Straight leg raise right: negative  Straight leg raise left: negative    Other   Toe walk: normal  Heel walk: normal  Sensation: normal  Gait: normal             No results found for: WBC, HGB, HCT, PLT, CHOL, TRIG, HDL, LDLDIRECT, ALT, AST, NA, K, CL, CREATININE, BUN, CO2, TSH, PSA, INR, GLUF, LABA1C, LABMICR    Assessment:                            Active Hospital Problems    Diagnosis Date Noted    Lumbar facet joint syndrome [M47.816] 2022    Panniculitis involving lumbar region [M54.06] 2022                  Plan:   Proceed with planned procedure  - Right L5-S1 RFA    The patientunderstands the planned operation and its associated risks and benefits and agrees to proceed. The surgical consent form has been signed. Last NSAID/anticoagulant medication use was:na    Premedication taken for contrast allergy? No    Valium taken for oral sedation?  No

## 2022-03-28 NOTE — INTERVAL H&P NOTE
I have interviewed and examined the patient and reviewed the recent History and Physical.  There have been no changes to the recent H&P documentation. The surgical consent form has been signed. Last anticoagulant medication use was:sodium    Premedication taken for contrast allergy? No    Valium taken for oral sedation? No    Outpatient Medications Marked as Taking for the 3/28/22 encounter [facility] Encounter)   Medication Sig Dispense Refill    omeprazole (PRILOSEC) 20 MG delayed release capsule Take 20 mg by mouth daily Indications: Heartburn, patient buys OTC         The patient understands the planned operation and its associated risks and benefits and agrees to proceed.         Electronically signed by Nicole Hagan MD on 3/28/2022 at 10:06 AM

## 2022-03-31 ENCOUNTER — PRE-PROCEDURE TELEPHONE (OUTPATIENT)
Dept: PREADMISSION TESTING | Age: 32
End: 2022-03-31

## 2022-03-31 NOTE — TELEPHONE ENCOUNTER
Voicemail message left regarding a covid swab appt for April 7th at 0830. Instructed to call [phone] to confirm this appt time.

## 2022-04-04 ENCOUNTER — PRE-PROCEDURE TELEPHONE (OUTPATIENT)
Dept: PREADMISSION TESTING | Age: 32
End: 2022-04-04

## 2022-04-04 NOTE — TELEPHONE ENCOUNTER
Spoke with patient to schedule a COVID swab appointment for Thursday, April 7th @ 7:30 am - instructions given and patient voices understanding.

## 2022-04-07 ENCOUNTER — HOSPITAL ENCOUNTER (OUTPATIENT)
Dept: PREADMISSION TESTING | Age: 32
Setting detail: SPECIMEN
Discharge: HOME | End: 2022-04-11
Payer: COMMERCIAL

## 2022-04-07 DIAGNOSIS — Z11.59 SPECIAL SCREENING EXAMINATION FOR VIRAL DISEASE: Primary | ICD-10-CM

## 2022-04-07 PROCEDURE — U0005 INFEC AGEN DETEC AMPLI PROBE: HCPCS

## 2022-04-07 PROCEDURE — U0003 INFECTIOUS AGENT DETECTION BY NUCLEIC ACID (DNA OR RNA); SEVERE ACUTE RESPIRATORY SYNDROME CORONAVIRUS 2 (SARS-COV-2) (CORONAVIRUS DISEASE [COVID-19]), AMPLIFIED PROBE TECHNIQUE, MAKING USE OF HIGH THROUGHPUT TECHNOLOGIES AS DESCRIBED BY CMS-2020-01-R: HCPCS

## 2022-04-08 LAB
SARS-COV-2: NORMAL
SARS-COV-2: NOT DETECTED
SOURCE: NORMAL

## 2022-04-12 ENCOUNTER — APPOINTMENT (OUTPATIENT)
Dept: GENERAL RADIOLOGY | Age: 32
End: 2022-04-12
Attending: PHYSICAL MEDICINE & REHABILITATION
Payer: COMMERCIAL

## 2022-04-12 ENCOUNTER — HOSPITAL ENCOUNTER (OUTPATIENT)
Age: 32
Setting detail: OUTPATIENT SURGERY
Discharge: HOME | End: 2022-04-12
Attending: PHYSICAL MEDICINE & REHABILITATION | Admitting: PHYSICAL MEDICINE & REHABILITATION
Payer: COMMERCIAL

## 2022-04-12 VITALS
HEART RATE: 64 BPM | HEIGHT: 71 IN | RESPIRATION RATE: 16 BRPM | DIASTOLIC BLOOD PRESSURE: 67 MMHG | TEMPERATURE: 97.8 F | BODY MASS INDEX: 26.6 KG/M2 | WEIGHT: 190 LBS | OXYGEN SATURATION: 99 % | SYSTOLIC BLOOD PRESSURE: 120 MMHG

## 2022-04-12 PROCEDURE — 7100000010 HC PHASE II RECOVERY - FIRST 15 MIN: Performed by: PHYSICAL MEDICINE & REHABILITATION

## 2022-04-12 PROCEDURE — 2500000003 HC RX 250 WO HCPCS: Performed by: PHYSICAL MEDICINE & REHABILITATION

## 2022-04-12 PROCEDURE — 6360000002 HC RX W HCPCS: Performed by: PHYSICAL MEDICINE & REHABILITATION

## 2022-04-12 PROCEDURE — 2709999900 HC NON-CHARGEABLE SUPPLY: Performed by: PHYSICAL MEDICINE & REHABILITATION

## 2022-04-12 PROCEDURE — 3600000056 HC PAIN LEVEL 4 BASE: Performed by: PHYSICAL MEDICINE & REHABILITATION

## 2022-04-12 PROCEDURE — 64635 DESTROY LUMB/SAC FACET JNT: CPT | Performed by: PHYSICAL MEDICINE & REHABILITATION

## 2022-04-12 PROCEDURE — 3209999900 FLUORO FOR SURGICAL PROCEDURES

## 2022-04-12 RX ORDER — LIDOCAINE HYDROCHLORIDE 40 MG/ML
INJECTION, SOLUTION RETROBULBAR; TOPICAL PRN
Status: DISCONTINUED | OUTPATIENT
Start: 2022-04-12 | End: 2022-04-12 | Stop reason: ALTCHOICE

## 2022-04-12 RX ORDER — BUPIVACAINE HYDROCHLORIDE 5 MG/ML
INJECTION, SOLUTION EPIDURAL; INTRACAUDAL PRN
Status: DISCONTINUED | OUTPATIENT
Start: 2022-04-12 | End: 2022-04-12 | Stop reason: ALTCHOICE

## 2022-04-12 RX ORDER — DEXAMETHASONE SODIUM PHOSPHATE 10 MG/ML
INJECTION INTRAMUSCULAR; INTRAVENOUS PRN
Status: DISCONTINUED | OUTPATIENT
Start: 2022-04-12 | End: 2022-04-12 | Stop reason: ALTCHOICE

## 2022-04-12 ASSESSMENT — PAIN DESCRIPTION - DESCRIPTORS: DESCRIPTORS: ACHING;STABBING

## 2022-04-12 ASSESSMENT — PAIN - FUNCTIONAL ASSESSMENT: PAIN_FUNCTIONAL_ASSESSMENT: 0-10

## 2022-04-12 ASSESSMENT — PAIN SCALES - GENERAL: PAINLEVEL_OUTOF10: 0

## 2022-04-12 NOTE — OP NOTE
RADIOFREQUENCY ABLATION OPERATIVE REPORT    4/12/22   The patient was counseled at length about the risks of ana maria Covid-19 during their perioperative period and any recovery window from their procedure. The patient was made aware that ana maria Covid-19  may worsen their prognosis for recovering from their procedure  and lend to a higher morbidity and/or mortality risk. All material risks, benefits, and reasonable alternatives including postponing the procedure were discussed. The patient does wish to proceed with the procedure at this time. Surgeon: Melita Harvey MD    Pre-operative Diagnosis:   Hospital Problems           Last Modified POA    * (Principal) Panniculitis involving lumbar region 4/12/2022 Yes    Lumbar facet joint syndrome 4/12/2022 Yes          Post-operative Diagnosis: Same    INDICATION:  Left L5-S1 radiofrequency neurotomy procedure is requested for therapeutic reasons. Please see H&P for details on previous treatments, examination findings, and work up. left L5-S1 medial branch blocks are requested for diagnostic reasons. Conservative treatment was ineffective i.e.: ice, NSAIDS, rest,     Patient is unable to perform the following ADL's: ambulating, grooming, sitting and standing     Pain Assessment: 0-10  Pain Level: 4     Pain Orientation: Right,Left,Lower  Pain Location: Back  Pain Descriptors: Aching,Stabbing    Last Plain films: 2020    EXAMINATION:  Left L5-S1 radiofrequency neurotomies. CONSENT:  Written consent was obtained from the patient on the preprinted consent form after explaining the procedure, indications, potential complications and outcomes. Alternative treatments were also discussed. DISCUSSION:  The patient was sterilely prepped and draped in the usual fashion in the prone position. Time out was verified for the correct patient, side, level and procedure. SEDATION:   No conscious sedation was performed during the procedure.   The patient remained awake and conversed throughout the procedure. The patient underwent pulse oximetry and blood pressure monitoring independently by a trained observer, as well as by a physician. FACET RF    Under image-intensifier control, a [id number]  mm GenieBelt RFK insulated radiofrequency probe with 5 mm active tips were successfully directed at the Left LL5-S1 medial branches of the dorsal rami at the target points described by REESE. Needle tip positions were confirmed in 3 views and sensory and motor test stimulation was performed. The patient reported sensory stimulation at 0.0 to 0.0 volts at 50 Hz and motor stimulation at 0.0 to 0.9 volts at 2 Hz, which confirmed satisfactory sensory motor dissociation. 1 ml of 2% lidocaine was instilled  at each site prior to lesioning. 1 lesions were performed at each level  BY SAGITTAL APPROACH at a temperature of 80 degrees Celsius for a duration of 90 seconds, as described by REESE. Impedance was measured and ranged from 176-287 ohms. Then, 0.5mL of  0.5% bupivacaine was instilled at each site after lesioning. The patient tolerated the procedure(s) well and without complications and was noted to be in stable condition prior to discharge from procedure center with discharge instructions. EBL: no blood loss    SPECIMEN: none    IMPRESSION:    1. Left LL5-S1 radiofrequency neurotomies performed uneventfully. RECOMMENDATIONS:  1. Follow up in the [address] in 2 to 4 weeks  2. Continue Neurontin and opioid analgesia, as per protocol. 3.    Complete and return the \"Post-Procedure Pain and Activity Diary. 4.    Contact the [address] for symptom exacerbation, fever or unusual symptoms. 5.    Follow post-procedure care according to verbal and written instructions.     POST-PROCEDURE LUMBAR SPINE FLUOROSCOPIC IMAGE INTERPRETATION:    EXAMINATION:  AP, lateral and oblique views of the lumbar spine    FLUORO TIME: 21 seconds    DISCUSSION:  Spot views of the spine reveal normal alignment and segmentation. Spinal needles are positioned at the base of the SAP and across the pedicle on AP and lateral views. #3:  across the ventral and middle third of the articular pillar. Visualized spine reveals See radiology report. Soft tissues reveal no abnormalities. IMPRESSION: Satisfactory probe placement for RF (radiofrequency) lesioning of the Left LL5-S1 medial branches.     Electronically signed by Lawson Gupta MD on 4/12/2022 at 1:59 PM

## 2022-04-12 NOTE — H&P
Subjective:       Patient is here today for a diagnostic/therapeutic injection. 4/12/22    Active Hospital Problems    Diagnosis Date Noted    Lumbar facet joint syndrome [M47.816] 01/11/2022    Panniculitis involving lumbar region [M54.06] 01/11/2022       HPI: Patient is here today for adiagnostic/therapeutic injection. The most recent [facility] Pain Management office visit notes have been reviewed and are unchanged. Review of Systems    Allergies   Allergen Reactions    Penicillin V Hives          Prior to Admission medications    Medication Sig Start Date End Date Taking?  Authorizing Provider   omeprazole (PRILOSEC) 20 MG delayed release capsule Take 20 mg by mouth daily Indications: Heartburn, patient buys OTC    Historical Provider, MD   ibuprofen (ADVIL;MOTRIN) 200 MG tablet Take 200 mg by mouth every 6 hours as needed for Pain     Historical Provider, MD   famotidine (PEPCID) 20 MG tablet Take 20 mg by mouth 2 times daily    Historical Provider, MD   aspirin-acetaminophen-caffeine (Ibirapita 8057) [phone] MG per tablet Take 1 tablet by mouth every 6 hours as needed for Headaches     Historical Provider, MD   raNITIdine HCl (RANITIDINE 75 PO) Take by mouth     Historical Provider, MD   diclofenac (VOLTAREN) 75 MG EC tablet Take 1 tablet by mouth 2 times daily  10/12/21   Historical Provider, MD       Past Medical History:   Diagnosis Date    Foraminal stenosis of lumbar region     L5-S1    GERD (gastroesophageal reflux disease)     Headache     migraines    History of herniorrhaphy     HTN (hypertension)     Spinal stenosis     Spondylosis of lumbosacral region     L5-S1       Past Surgical History:   Procedure Laterality Date    COLONOSCOPY  2019    COLONOSCOPY  01/2022    HERNIA REPAIR Left     inguinal    NERVE BLOCK Bilateral 1/21/2022    Bilateral L5-S1 Diagnostic Medial Branch Block performed by Tobias Alcocer MD at [address] [place] [address] 2022    Bilateral L5-S1 Confirmatory Medial Branch Block performed by Chai Heller MD at [address] [facility] [place] Right 3/28/2022    Right L5-S1 RADIOFREQUENCY Ablation performed by Chai Heller MD at [address] [place] [address] Road  2021       Family andSocial History reviewed in the electronic medical record. Imaging: Reviewed available imaging in oursystem with the patient. No results found. Objective:     Vitals:    22 1307   BP: 122/74   Pulse: 66   Resp: 16   Temp: 97.8 °F (36.6 °C)   SpO2: 100%          Physical Exam  Constitutional:       Appearance: He is well-developed. HENT:      Head: Normocephalic and atraumatic. Cardiovascular:      Pulses: Normal pulses. Comments: Warm extremities. Normal capillary refill. Pulmonary:      Effort: Pulmonary effort is normal.   Abdominal:      General: Abdomen is flat. Palpations: Abdomen is soft. Musculoskeletal:      Lumbar back: Negative right straight leg raise test and negative left straight leg raise test.   Skin:     General: Skin is warm and dry. Neurological:      General: No focal deficit present. Mental Status: He is alert and oriented to person, place, and time. Cranial Nerves: No cranial nerve deficit. Sensory: No sensory deficit. Motor: No atrophy or abnormal muscle tone. Deep Tendon Reflexes: Reflexes are normal and symmetric. Psychiatric:         Mood and Affect: Mood normal.         Speech: Speech normal.         Behavior: Behavior normal.       Neurologic Exam     Mental Status   Oriented to person, place, and time. Speech: speech is normal     Motor Exam     Strength   Right quadriceps: 5/5  Left quadriceps: 5/5  Right hamstrin/5  Left hamstrin/5    Back Exam     Tenderness   The patient is experiencing tenderness in the lumbar (+kemps Left).     Range of Motion   Extension: normal   Flexion: normal   Lateral bend right: normal   Lateral bend left: normal Rotation right: normal   Rotation left: normal     Muscle Strength   Right Quadriceps:  5/5   Left Quadriceps:  5/5   Right Hamstrings:  5/5   Left Hamstrings:  5/5     Tests   Straight leg raise right: negative  Straight leg raise left: negative    Other   Toe walk: normal  Heel walk: normal  Sensation: normal  Gait: normal             No results found for: WBC, HGB, HCT, PLT, CHOL, TRIG, HDL, LDLDIRECT, ALT, AST, NA, K, CL, CREATININE, BUN, CO2, TSH, PSA, INR, GLUF, LABA1C, LABMICR    Assessment:                            Active Hospital Problems    Diagnosis Date Noted    Lumbar facet joint syndrome [M47.816] 01/11/2022    Panniculitis involving lumbar region [M54.06] 01/11/2022                  Plan:   Proceed with planned procedure  - Left L5-S1 RFA    The patientunderstands the planned operation and its associated risks and benefits and agrees to proceed. The surgical consent form has been signed. Last NSAID/anticoagulant medication use was:na    Premedication taken for contrast allergy? No    Valium taken for oral sedation?  No

## 2022-04-12 NOTE — INTERVAL H&P NOTE
I have interviewed and examined the patient and reviewed the recent History and Physical.  There have been no changes to the recent H&P documentation. The surgical consent form has been signed. Last anticoagulant medication use was:na    Premedication taken for contrast allergy? No    Valium taken for oral sedation? No    No outpatient medications have been marked as taking for the 4/12/22 encounter [facility] Encounter). The patient understands the planned operation and its associated risks and benefits and agrees to proceed.         Electronically signed by Pawel Lentz MD on 4/12/2022 at 1:57 PM

## (undated) DEVICE — COVER LT HNDL BLU PLAS

## (undated) DEVICE — C-ARMOR C-ARM EQUIPMENT COVERS CLEAR STERILE UNIVERSAL FIT 12 PER CASE: Brand: C-ARMOR

## (undated) DEVICE — GLOVE SURG SZ 8 L12IN THK91MIL BRN LTX FREE POLYCHLOROPRENE

## (undated) DEVICE — CANNULA 15CM, 5MM TIP, 18G: Brand: CANNULARFK

## (undated) DEVICE — TRAY PAIN CUST

## (undated) DEVICE — GLOVE ORANGE PI 7   MSG9070

## (undated) DEVICE — NEEDLE FLTR 18GA L1.5IN MEM THK5UM BLNT DISP

## (undated) DEVICE — CHLORAPREP 26ML CLEAR

## (undated) DEVICE — PAD ELECSURG GRND DISP

## (undated) DEVICE — CANNULA NSL AD L2IN ETCO2 SAMP SFT CRUSH RESIST FEM AIRLFE

## (undated) DEVICE — BANDAGE ADH DIA7/8IN NAT FLEX-FABRIC WVN FOR WND PROTCT

## (undated) DEVICE — NEEDLE, QUINCKE, 22GX5": Brand: MEDLINE

## (undated) DEVICE — GOWN,AURORA,NONRNF,XL,30/CS: Brand: MEDLINE

## (undated) DEVICE — GLOVE ORANGE PI 8   MSG9080

## (undated) DEVICE — COVER,TABLE,77X90,STERILE: Brand: MEDLINE

## (undated) DEVICE — Z DISCONTINUED USE 2651424 COVER EQUIP D18IN CNTOUR ELAS BND SNUG CLSR

## (undated) DEVICE — SHEET, T, LAPAROTOMY, STERILE: Brand: MEDLINE

## (undated) DEVICE — GOWN,AURORA,NONREINFORCED,LARGE: Brand: MEDLINE

## (undated) DEVICE — LINE SAMP O2 6.5FT W/FEMALE CONN F/ADULT CAPNOLINE PLUS